# Patient Record
Sex: FEMALE | Race: WHITE | NOT HISPANIC OR LATINO | URBAN - METROPOLITAN AREA
[De-identification: names, ages, dates, MRNs, and addresses within clinical notes are randomized per-mention and may not be internally consistent; named-entity substitution may affect disease eponyms.]

---

## 2021-04-09 ENCOUNTER — APPOINTMENT (RX ONLY)
Dept: URBAN - METROPOLITAN AREA CLINIC 312 | Facility: CLINIC | Age: 71
Setting detail: DERMATOLOGY
End: 2021-04-09

## 2021-04-09 DIAGNOSIS — Z48.817 ENCOUNTER FOR SURGICAL AFTERCARE FOLLOWING SURGERY ON THE SKIN AND SUBCUTANEOUS TISSUE: ICD-10-CM

## 2021-04-09 PROCEDURE — ? POST-OP WOUND CHECK

## 2021-04-09 PROCEDURE — 99024 POSTOP FOLLOW-UP VISIT: CPT

## 2021-04-09 ASSESSMENT — LOCATION ZONE DERM: LOCATION ZONE: LEG

## 2021-04-09 ASSESSMENT — LOCATION SIMPLE DESCRIPTION DERM: LOCATION SIMPLE: RIGHT PRETIBIAL REGION

## 2021-04-09 ASSESSMENT — LOCATION DETAILED DESCRIPTION DERM: LOCATION DETAILED: RIGHT DISTAL PRETIBIAL REGION

## 2021-04-09 NOTE — PROCEDURE: POST-OP WOUND CHECK
Body Location Override (Optional - Billing Will Still Be Based On Selected Body Map Location If Applicable): Right lower anterior leg
Detail Level: Detailed
Add 85861 Cpt? (Important Note: In 2017 The Use Of 47844 Is Being Tracked By Cms To Determine Future Global Period Reimbursement For Global Periods): yes
Wound Assessment Override (Optional): area has healed, pink as expected, somewhat friable and susceptible to trauma at this stage
Wound Dressing Override (Optional): Eucerin at night-time for moisturization
Wound Evaluated By: Lyle Navarro MD
Additional Comments: Advised that healed area will be somewhat tender for several months, more susceptible to trauma than normal skin.  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

## 2021-05-04 ENCOUNTER — APPOINTMENT (RX ONLY)
Dept: URBAN - METROPOLITAN AREA CLINIC 312 | Facility: CLINIC | Age: 71
Setting detail: DERMATOLOGY
End: 2021-05-04

## 2021-05-04 VITALS — TEMPERATURE: 97.1 F
